# Patient Record
Sex: MALE | Race: BLACK OR AFRICAN AMERICAN | Employment: UNEMPLOYED | URBAN - METROPOLITAN AREA
[De-identification: names, ages, dates, MRNs, and addresses within clinical notes are randomized per-mention and may not be internally consistent; named-entity substitution may affect disease eponyms.]

---

## 2017-05-09 ENCOUNTER — HOSPITAL ENCOUNTER (INPATIENT)
Age: 27
LOS: 3 days | Discharge: HOME | DRG: 885 | End: 2017-05-12
Attending: PSYCHIATRY & NEUROLOGY | Admitting: PSYCHIATRY & NEUROLOGY
Payer: COMMERCIAL

## 2017-05-09 PROBLEM — F19.20 POLYSUBSTANCE DEPENDENCE (HCC): Status: ACTIVE | Noted: 2017-05-09

## 2017-05-09 PROBLEM — F29 PSYCHOSIS (HCC): Status: ACTIVE | Noted: 2017-05-09

## 2017-05-09 PROBLEM — F29 PSYCHOTIC DISORDER (HCC): Status: ACTIVE | Noted: 2017-05-09

## 2017-05-09 PROBLEM — F31.2 BIPOLAR AFFECTIVE DISORDER, MANIC, SEVERE, WITH PSYCHOTIC BEHAVIOR (HCC): Status: ACTIVE | Noted: 2017-05-09

## 2017-05-09 PROCEDURE — 74011250637 HC RX REV CODE- 250/637: Performed by: PSYCHIATRY & NEUROLOGY

## 2017-05-09 PROCEDURE — 65220000003 HC RM SEMIPRIVATE PSYCH

## 2017-05-09 RX ORDER — DIAZEPAM 5 MG/1
10 TABLET ORAL
Status: DISCONTINUED | OUTPATIENT
Start: 2017-05-09 | End: 2017-05-11

## 2017-05-09 RX ORDER — IBUPROFEN 400 MG/1
400 TABLET ORAL
Status: DISCONTINUED | OUTPATIENT
Start: 2017-05-09 | End: 2017-05-12 | Stop reason: HOSPADM

## 2017-05-09 RX ORDER — PROCHLORPERAZINE EDISYLATE 5 MG/ML
10 INJECTION INTRAMUSCULAR; INTRAVENOUS
Status: DISCONTINUED | OUTPATIENT
Start: 2017-05-09 | End: 2017-05-12 | Stop reason: HOSPADM

## 2017-05-09 RX ORDER — LANOLIN ALCOHOL/MO/W.PET/CERES
100 CREAM (GRAM) TOPICAL DAILY
Status: DISCONTINUED | OUTPATIENT
Start: 2017-05-09 | End: 2017-05-11

## 2017-05-09 RX ORDER — FOLIC ACID 1 MG/1
1 TABLET ORAL DAILY
Status: DISCONTINUED | OUTPATIENT
Start: 2017-05-09 | End: 2017-05-11

## 2017-05-09 RX ORDER — BENZTROPINE MESYLATE 2 MG/1
2 TABLET ORAL
Status: DISCONTINUED | OUTPATIENT
Start: 2017-05-09 | End: 2017-05-12 | Stop reason: HOSPADM

## 2017-05-09 RX ORDER — ACETAMINOPHEN 325 MG/1
650 TABLET ORAL
Status: DISCONTINUED | OUTPATIENT
Start: 2017-05-09 | End: 2017-05-12 | Stop reason: HOSPADM

## 2017-05-09 RX ORDER — OLANZAPINE 5 MG/1
5 TABLET ORAL
Status: DISCONTINUED | OUTPATIENT
Start: 2017-05-09 | End: 2017-05-12 | Stop reason: HOSPADM

## 2017-05-09 RX ORDER — BENZTROPINE MESYLATE 1 MG/ML
2 INJECTION INTRAMUSCULAR; INTRAVENOUS
Status: DISCONTINUED | OUTPATIENT
Start: 2017-05-09 | End: 2017-05-12 | Stop reason: HOSPADM

## 2017-05-09 RX ORDER — LORAZEPAM 1 MG/1
1 TABLET ORAL
Status: DISCONTINUED | OUTPATIENT
Start: 2017-05-09 | End: 2017-05-09

## 2017-05-09 RX ORDER — LORAZEPAM 2 MG/ML
2 INJECTION INTRAMUSCULAR
Status: DISCONTINUED | OUTPATIENT
Start: 2017-05-09 | End: 2017-05-12 | Stop reason: HOSPADM

## 2017-05-09 RX ORDER — IBUPROFEN 200 MG
1 TABLET ORAL
Status: DISCONTINUED | OUTPATIENT
Start: 2017-05-09 | End: 2017-05-12 | Stop reason: HOSPADM

## 2017-05-09 RX ORDER — PROCHLORPERAZINE MALEATE 10 MG
10 TABLET ORAL
Status: DISCONTINUED | OUTPATIENT
Start: 2017-05-09 | End: 2017-05-12 | Stop reason: HOSPADM

## 2017-05-09 RX ORDER — DIAZEPAM 5 MG/1
20 TABLET ORAL
Status: DISCONTINUED | OUTPATIENT
Start: 2017-05-09 | End: 2017-05-11

## 2017-05-09 RX ORDER — ADHESIVE BANDAGE
30 BANDAGE TOPICAL DAILY PRN
Status: DISCONTINUED | OUTPATIENT
Start: 2017-05-09 | End: 2017-05-12 | Stop reason: HOSPADM

## 2017-05-09 RX ORDER — ZOLPIDEM TARTRATE 10 MG/1
10 TABLET ORAL
Status: DISCONTINUED | OUTPATIENT
Start: 2017-05-09 | End: 2017-05-12 | Stop reason: HOSPADM

## 2017-05-09 RX ADMIN — Medication 100 MG: at 12:00

## 2017-05-09 RX ADMIN — MULTIPLE VITAMINS W/ MINERALS TAB 1 TABLET: TAB at 12:00

## 2017-05-09 RX ADMIN — FOLIC ACID 1 MG: 1 TABLET ORAL at 12:00

## 2017-05-09 NOTE — H&P
INITIAL PSYCHIATRIC EVALUATION         IDENTIFICATION:    Patient Name  Elizabeth Martin   Date of Birth 1990   [facility] [id number]   Medical Record Number  [id number]      Age  28 y.o. PCP None   Admit date:  5/9/2017    Room Number  308/01  @ [facility]   Date of Service  5/9/2017            HISTORY         REASON FOR HOSPITALIZATION:  CC: \"manic and psychotic\". Pt admitted under a temporary snf order (TDO) for severe psychosis and justina proving to be/posing an imminent danger to self and others and an inability to care for self. HISTORY OF PRESENT ILLNESS:    The patient, Elizabeth Martin, is a 28 y.o. BLACK OR  male with a past psychiatric history significant for polysub dep, who presents at this time with complaints of (and/or evidence of) the following emotional symptoms: justina and psychosis. Additional symptomatology include grandiose, paranoid, Congregational preoc, aud and visual timoteo, and agitation. The above symptoms have been present for a couple days. These symptoms are of severe severity. These symptoms are intermittent/ fleeting in nature. The patient's condition has been precipitated by psychosocial stressors (no job x 1 month ). Patient's condition made worse by continued illicit drug use and alcohol use as well as treatment noncompliance. UDS: +MJ  BAL=188. ALLERGIES:  No Known Allergies   MEDICATIONS PRIOR TO ADMISSION:  No prescriptions prior to admission. PAST MEDICAL HISTORY:  Past Medical History:   Diagnosis Date    Polysubstance dependence ([facility] [address]    No past surgical history on file. SOCIAL HISTORY:    Social History     Social History    Marital status: SINGLE     Spouse name: N/A    Number of children: N/A    Years of education: N/A     Occupational History    Not on file. Social History Main Topics    Smoking status: Current Every Day Smoker     Types: Cigarettes    Smokeless tobacco: Not on file    Alcohol use Yes    Drug use:  Yes Special: Marijuana    Sexual activity: Not on file     Other Topics Concern    Not on file     Social History Narrative    Single, no children, lives with parents, born in [place] [facility], came to [address] in 2007. College grad. No job x 1 mo,       FAMILY HISTORY: History reviewed. No pertinent family history. No family history on file. REVIEW OF SYSTEMS:   Psychological ROS: positive for - behavioral disorder, irritability and mood swings  negative for - suicidal ideation  Pertinent items are noted in the History of Present Illness. All other Systems reviewed and are considered negative. MENTAL STATUS EXAM & VITALS         MENTAL STATUS EXAM (MSE):    MSE FINDINGS ARE WITHIN NORMAL LIMITS (WNL) UNLESS OTHERWISE STATED BELOW. ( ALL OF THE BELOW CATEGORIES OF THE MSE HAVE BEEN REVIEWED (reviewed 5/9/2017) AND UPDATED AS DEEMED APPROPRIATE )  General Presentation age appropriate and casually dressed, cooperative   Orientation disorganized, not oriented to situation, oriented to time, place and person   Vital Signs  See below (reviewed 5/9/2017); Vital Signs (BP, Pulse, & Temp) are within normal limits if not listed below.    Gait and Station Stable/steady, no ataxia   Musculoskeletal System No extrapyramidal symptoms (EPS); no abnormal muscular movements or Tardive Dyskinesia (TD); muscle strength and tone are within normal limits   Language No aphasia or dysarthria   Speech:  strong  accent   Thought Processes logical; normal rate of thoughts; fair abstract reasoning/computation   Thought Associations goal directed   Thought Content paranoid delusions, grandiose delusions, Quaker delusions, auditory hallucinations and visual hallucinations   Suicidal Ideations none   Homicidal Ideations none   Mood:  angry and labile    Affect:  happy   Memory recent  fair   Memory remote:  fair   Concentration/Attention:  distractable   Fund of Knowledge average   Insight:  limited   Reliability poor Judgment:  limited            VITALS:     Patient Vitals for the past 24 hrs:   Pulse Resp BP   05/09/17 1339 75 - -   05/09/17 1334 (!) 5 16 142/86   05/09/17 0914 (!) 59 20 113/76     Wt Readings from Last 3 Encounters:   05/09/17 76.2 kg (168 lb)     Temp Readings from Last 3 Encounters:   No data found for Temp     BP Readings from Last 3 Encounters:   05/09/17 142/86     Pulse Readings from Last 3 Encounters:   05/09/17 75            DATA       LABORATORY DATA:  Labs Reviewed - No data to display  No results found for any previous visit. RADIOLOGY REPORTS:  No results found for this or any previous visit. No results found.            MEDICATIONS       ALL MEDICATIONS  Current Facility-Administered Medications   Medication Dose Route Frequency    ziprasidone (GEODON) 20 mg in sterile water (preservative free) 1 mL injection  20 mg IntraMUSCular BID PRN    OLANZapine (ZyPREXA) tablet 5 mg  5 mg Oral Q6H PRN    benztropine (COGENTIN) tablet 2 mg  2 mg Oral BID PRN    benztropine (COGENTIN) injection 2 mg  2 mg IntraMUSCular BID PRN    LORazepam (ATIVAN) injection 2 mg  2 mg IntraMUSCular Q4H PRN    zolpidem (AMBIEN) tablet 10 mg  10 mg Oral QHS PRN    acetaminophen (TYLENOL) tablet 650 mg  650 mg Oral Q4H PRN    ibuprofen (MOTRIN) tablet 400 mg  400 mg Oral Q8H PRN    magnesium hydroxide (MILK OF MAGNESIA) 400 mg/5 mL oral suspension 30 mL  30 mL Oral DAILY PRN    nicotine (NICODERM CQ) 21 mg/24 hr patch 1 Patch  1 Patch TransDERmal DAILY PRN    diazePAM (VALIUM) tablet 10 mg  10 mg Oral Q1H PRN    diazePAM (VALIUM) tablet 20 mg  20 mg Oral Q1H PRN    prochlorperazine (COMPAZINE) injection 10 mg  10 mg IntraMUSCular Q6H PRN    prochlorperazine (COMPAZINE) tablet 10 mg  10 mg Oral C9L PRN    folic acid (FOLVITE) tablet 1 mg  1 mg Oral DAILY    thiamine (B-1) tablet 100 mg  100 mg Oral DAILY    multivitamin, tx-iron-ca-min (THERA-M w/ IRON) tablet 1 Tab  1 Tab Oral DAILY      SCHEDULED MEDICATIONS  Current Facility-Administered Medications   Medication Dose Route Frequency    folic acid (FOLVITE) tablet 1 mg  1 mg Oral DAILY    thiamine (B-1) tablet 100 mg  100 mg Oral DAILY    multivitamin, tx-iron-ca-min (THERA-M w/ IRON) tablet 1 Tab  1 Tab Oral DAILY                ASSESSMENT & PLAN        The patient Oren Oliva is a 28 y.o.  male who presents at this time for treatment of the following diagnoses:  Patient C/Vincenzo Berger 1106 Problem List:   Bipolar affective disorder, manic, severe, with psychotic behavior ([facility] [address] (5/9/2017)    Assessment: rule out vs substance induced justina/psychosis    Plan: observe, need more collateral info to elucidate nature of pathology, psych prns   Polysubstance dependence ([facility] (5/9/2017)    Assessment: severe    Plan: detox from alcohol          In summary, Oren Oliva presents with a severe exacerbation of the principal diagnosis, Bipolar affective disorder, manic, severe, with psychotic behavior ([facility]    While on the unit Oren Oliva will be provided with individual, milieu, occupational, group, and substance abuse therapies to address target symptoms as deemed appropriate for the individual patient. I agree with decision to admit patient. I have spoken to [facility] psychiatric /ED staff regarding the nature of patients's admission at this time. A coordinated, multidisplinary treatment team (includes the nurse, unit pharmcist,  and writer) round was conducted for this initial evaluation with the patient present. The following regarding medications was addressed during rounds with patient:   the risks and benefits of the proposed medication. The patient was given the opportunity to ask questions. Informed consent given to the use of the above medications.      I will continue to adjust psychiatric and non-psychiatric medications (see above \"medication\" section and orders section for details) as deemed appropriate & based upon diagnoses and response to treatment. I have reviewed admission (and previous/old) labs and medical tests in the EHR and or transferring hospital documents. I will continue to order blood tests/labs and diagnostic tests as deemed appropriate and review results as they become available (see orders for details). I have reviewed old psychiatric and medical records available in the EHR. I Will order additional psychiatric records from other institutions to further elucidate the nature of patient's psychopathology and review once available. I will gather additional collateral information from friends, family and o/p treatment team to further elucidate the nature of patient's psychopathology and baselline level of psychiatric functioning.         ESTIMATED LENGTH OF STAY:   7 days       STRENGTHS:  Exercising self-direction/Resourceful and Access to housing/residential stability                      SIGNED:    Allen Fernández MD  5/9/2017
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
## 2017-05-09 NOTE — BH NOTES
SOCIAL WORK NOTE:  Pt did not attend processing group.       RAYMOND Rajput, Supervisee in Social Work

## 2017-05-09 NOTE — IP AVS SNAPSHOT
Summary of Care Report The Summary of Care report has been created to help improve care coordination. Users with access to [employer] or [address] (Web-based application) may access additional patient information including the Discharge Summary. If you are not currently a [address] user and need more information, please call the number listed below in the [place] [address] section and ask to be connected with Medical Records. Facility Information Name Address Phone [facility] [address] [place] [id number] [phone] Patient Information Patient Name Sex PRASHANTH Abreu ([id number]) Male 1990 Discharge Information Admitting Provider Service Area Unit Raeann Reis MD / [phone] [id number] [facility] / [phone] Discharge Provider Discharge Date/Time Discharge Disposition Destination (none) 2017 Evening (Pending) AHR (none) Patient Language Language ENGLISH [13] Hospital Problems as of 2017  Never Reviewed Class Noted - Resolved Last Modified POA Active Problems * (Principal)Bipolar affective disorder, manic, severe, with psychotic behavior ([facility] [address]  2017 - Present 2017 by Arnel Diop MD Unknown Entered by Arnel Diop MD  
  Polysubstance dependence ([facility] [address]  2017 - Present 2017 by Arnel Diop MD Unknown Entered by Arnel Diop MD  
  
You are allergic to the following No active allergies Current Discharge Medication List  
  
Notice You have not been prescribed any medications. Follow-up Information Follow up With Details Comments Contact Info  [address] [place] [address]  Please follow up with [facility] by accessing walk-in Mon-Thurs from 8:30am-3pm and Friday 8:30am-2pm. Please bring ID, proof of address and income. Isidoro [address] [place] [address] [address] [address] [phone] None   None (395) Patient stated that they have no PCP Discharge Instructions DISCHARGE SUMMARY from Nurse The following personal items are in your possession at time of discharge: 
 
Dental Appliances: None Home Medications: None Jewelry: None Clothing: Belt, Pants, Shirt, Slippers (Brown belt, red shirt, beige pants, black and red flip flops) Other Valuables: Cell [employer], [employer] PATIENT INSTRUCTIONS: 
 
After general anesthesia or intravenous sedation, for 24 hours or while taking prescription Narcotics: · Limit your activities · Do not drive and operate hazardous machinery · Do not make important personal or business decisions · Do  not drink alcoholic beverages · If you have not urinated within 8 hours after discharge, please contact your surgeon on call. Report the following to your surgeon: 
· Excessive pain, swelling, redness or odor of or around the surgical area · Temperature over 100.5 · Nausea and vomiting lasting longer than 4 hours or if unable to take medications · Any signs of decreased circulation or nerve impairment to extremity: change in color, persistent  numbness, tingling, coldness or increase pain · Any questions *  Please give a list of your current medications to your Primary Care Provider. *  Please update this list whenever your medications are discontinued, doses are 
    changed, or new medications (including over-the-counter products) are added. *  Please carry medication information at all times in case of emergency situations. These are general instructions for a healthy lifestyle: No smoking/ No tobacco products/ Avoid exposure to second hand smoke Surgeon General's Warning:  Quitting smoking now greatly reduces serious risk to your health.  
 
Obesity, smoking, and sedentary lifestyle greatly increases your risk for illness A healthy diet, regular physical exercise & weight monitoring are important for maintaining a healthy lifestyle You may be retaining fluid if you have a history of heart failure or if you experience any of the following symptoms:  Weight gain of 3 pounds or more overnight or 5 pounds in a week, increased swelling in our hands or feet or shortness of breath while lying flat in bed. Please call your doctor as soon as you notice any of these symptoms; do not wait until your next office visit. Recognize signs and symptoms of STROKE: 
 
F-face looks uneven A-arms unable to move or move unevenly S-speech slurred or non-existent T-time-call 911 as soon as signs and symptoms begin-DO NOT go Back to bed or wait to see if you get better-TIME IS BRAIN. Warning Signs of HEART ATTACK Call 911 if you have these symptoms: 
? Chest discomfort. Most heart attacks involve discomfort in the center of the chest that lasts more than a few minutes, or that goes away and comes back. It can feel like uncomfortable pressure, squeezing, fullness, or pain. ? Discomfort in other areas of the upper body. Symptoms can include pain or discomfort in one or both arms, the back, neck, jaw, or stomach. ? Shortness of breath with or without chest discomfort. ? Other signs may include breaking out in a cold sweat, nausea, or lightheadedness. Don't wait more than five minutes to call [address]! Fast action can save your life. Calling 911 is almost always the fastest way to get lifesaving treatment. Emergency Medical Services staff can begin treatment when they arrive  up to an hour sooner than if someone gets to the hospital by car. If I feel that I can not keep these promises and I am at risk of hurting myself or others, I will call the crisis office and speak with a crisis worker who will assist me during my crisis. [address] [place] [address]                [phone] [facility] [place] [address]                      [phone] [place] Crisis           [phone] [facility]            [phone] The discharge information has been reviewed with the patient. The patient verbalized understanding. Discharge medications reviewed with the patient and appropriate educational materials and side effects teaching were provided. Chart Review Routing History No Routing History on File

## 2017-05-09 NOTE — IP AVS SNAPSHOT
Bertin Betancur 
 
 
 [address] Patient: Sergey Canales MRN: [id number]:[id number] You are allergic to the following No active allergies Recent Documentation Height Weight BMI Smoking Status 1.727 m 76.2 kg 25.54 kg/m2 Current Every Day Smoker About your hospitalization You were admitted on:  May 9, 2017 You last received care in the:  [facility] You were discharged on:  May 12, 2017 Unit phone number:  [phone] Why you were hospitalized Your primary diagnosis was:  Bipolar Affective Disorder, Manic, Severe, With Psychotic Behavior ([facility]) Your diagnoses also included:  Polysubstance Dependence ([facility]) Providers Seen During Your Hospitalizations Provider Role Specialty Primary office phone Ave Brandt MD Attending Provider Psychiatry [phone] Your Primary Care Physician (PCP) Primary Care Physician Office Phone Office Fax NONE ** None ** ** None ** Follow-up Information Follow up With Details Comments Contact [address]  Please follow up with [facility] by accessing walk-in Mon-Thurs from 8:30am-3pm and Friday 8:30am-2pm. Please bring ID, proof of address and income. [address] [address] [phone] None   None (395) Patient stated that they have no PCP Current Discharge Medication List  
  
Notice You have not been prescribed any medications. Discharge Instructions DISCHARGE SUMMARY from Nurse The following personal items are in your possession at time of discharge: 
 
Dental Appliances: None Home Medications: None Jewelry: None Clothing: Belt, Pants, Shirt, Slippers (Brown belt, red shirt, beige pants, black and red flip flops) Other Valuables: Cell Phone, Devang Bello PATIENT INSTRUCTIONS: 
 
 
F-face looks uneven A-arms unable to move or move unevenly S-speech slurred or non-existent T-time-call 911 as soon as signs and symptoms begin-DO NOT go Back to bed or wait to see if you get better-TIME IS BRAIN. Warning Signs of HEART ATTACK Call 911 if you have these symptoms: 
? Chest discomfort. Most heart attacks involve discomfort in the center of the chest that lasts more than a few minutes, or that goes away and comes back. It can feel like uncomfortable pressure, squeezing, fullness, or pain. ? Discomfort in other areas of the upper body. Symptoms can include pain or discomfort in one or both arms, the back, neck, jaw, or stomach. ? Shortness of breath with or without chest discomfort. ? Other signs may include breaking out in a cold sweat, nausea, or lightheadedness. Don't wait more than five minutes to call [address]! Fast action can save your life. Calling 911 is almost always the fastest way to get lifesaving treatment. Emergency Medical Services staff can begin treatment when they arrive  up to an hour sooner than if someone gets to the hospital by car. If I feel that I can not keep these promises and I am at risk of hurting myself or others, I will call the crisis office and speak with a crisis worker who will assist me during my crisis. [address] [place] [address]                [phone] [address] [place] [address],[place] [address]                      [phone] [address] [place] [address] Crisis           [phone] [facility] Crisis            [phone] The discharge information has been reviewed with the patient. The patient verbalized understanding. Discharge medications reviewed with the patient and appropriate educational materials and side effects teaching were provided. Discharge Orders None [facility] Announcement We are excited to announce that we are making your provider's discharge notes available to you in amSTATZ. You will see these notes when they are completed and signed by the physician that discharged you from your recent hospital stay. If you have any questions or concerns about any information you see in amSTATZ, please call the Health Information Department where you were seen or reach out to your Primary Care Provider for more information about your plan of care. Introducing [facility]! Mariella Yi introduces amSTATZ patient portal. Now you can access parts of your medical record, email your doctor's office, and request medication refills online. 1. In your internet browser, go to [email or web address] 2. Click on the First Time User? Click Here link in the Sign In box. You will see the New Member Sign Up page. 3. Enter your amSTATZ Access Code exactly as it appears below. You will not need to use this code after youve completed the sign-up process. If you do not sign up before the expiration date, you must request a new code. · amSTATZ Access Code: [email or web address] Expires: 8/7/2017  8:47 AM 
 
4. Enter the last four digits of your Social Security Number (xxxx) and Date of Birth (mm/dd/yyyy) as indicated and click Submit. You will be taken to the next sign-up page. 5. Create a amSTATZ ID. This will be your amSTATZ login ID and cannot be changed, so think of one that is secure and easy to remember. 6. Create a amSTATZ password. You can change your password at any time. 7. Enter your Password Reset Question and Answer. This can be used at a later time if you forget your password. 8. Enter your e-mail address. You will receive e-mail notification when new information is available in [address] 9. Click Sign Up. You can now view and download portions of your medical record.  
10. Click the Download Summary menu link to download a portable copy of your medical information. If you have questions, please visit the Frequently Asked Questions section of the [employer] website. Remember, MyChart is NOT to be used for urgent needs. For medical emergencies, dial 911. Now available from your iPhone and Android! General Information Please provide this summary of care documentation to your next provider. Patient Signature:  ____________________________________________________________ Date:  ____________________________________________________________  
  
Gatha Cyphers Provider Signature:  ____________________________________________________________ Date:  ____________________________________________________________

## 2017-05-09 NOTE — H&P
History and Physical    Subjective:     Krissy Monahan is a 28 y.o. male with past medical hx significant for polysubstance abuse. Pt is hospitalized with a diagnosis of decompensated schizophrenia. Pt denies any acute or chronic issues. Pt is showing no signs of serious medical issue. Pt admits to using marjuana. Past Medical History:   Diagnosis Date    Polysubstance dependence [facility])       PSHx: none declared by pt. FHx:DM     Social History   Substance Use Topics    Smoking status: Current Every Day Smoker     Types: Cigarettes    Smokeless tobacco: None    Alcohol use Yes     >uses marjuana    Prior to Admission medications    Not on File     No Known Allergies     Review of Systems:  Constitutional: negative  Eyes: negative  Ears, Nose, Mouth, Throat, and Face: negative  Respiratory: negative  Cardiovascular: negative  Gastrointestinal: negative  Genitourinary:negative  Integument/Breast: negative  Hematologic/Lymphatic: negative  Musculoskeletal:negative  Neurological: negative  Behavioral/Psychiatric: negative  Endocrine: negative  Allergic/Immunologic: negative     Objective: Intake and Output:            Physical Exam:   Visit Vitals    /69    Pulse 62    Temp 98.2 °F (36.8 °C)    Resp 16    Ht 5' 8\" (1.727 m)    Wt 76.2 kg (168 lb)    SpO2 100%    BMI 25.54 kg/m2     General:  Alert, cooperative, no distress, appears stated age. Head:  Normocephalic, without obvious abnormality, atraumatic. Eyes:  Conjunctivae/corneas clear. PERRL, EOMs intact. Ears:  Normal external ear canals both ears. Nose: Nares normal. Septum midline. Mucosa normal. No drainage or sinus tenderness. Throat: Lips, mucosa, and tongue normal. Teeth and gums normal.   Neck: Supple, symmetrical, trachea midline, no adenopathy, thyroid: no enlargement/tenderness/nodules, no carotid bruit and no JVD. Back:   Symmetric, no curvature. ROM normal. No CVA tenderness.    Lungs:   Clear to auscultation bilaterally. Chest wall:  No tenderness or deformity. Heart:  Regular rate and rhythm, S1, S2 normal, no murmur, click, rub or gallop. Abdomen:   Soft, non-tender. Bowel sounds normal. No masses,  No organomegaly. Extremities: Extremities normal, atraumatic, no cyanosis or edema. Pulses: 2+ and symmetric all extremities. Skin: Skin color, texture, turgor normal. No rashes or lesions   Lymph nodes: Cervical, supraclavicular, and axillary nodes normal.   Neurologic:} CNII-XII intact. Normal strength, sensation and reflexes throughout. ECG:       Data Review:   No results found for this or any previous visit (from the past 24 hour(s)). Assessment:     Principal Problem:    Bipolar affective disorder, manic, severe, with psychotic behavior ([facility] [address] (5/9/2017)    Active Problems:    Polysubstance dependence ([facility] [address] (5/9/2017)    Plan:     No acute medical issue identified. Cont psych stabilization. No VTE prophylaxis indicated or necessary at this time.    Signed By: Yaya Quispe MD     May 9, 2017

## 2017-05-10 PROCEDURE — 65220000003 HC RM SEMIPRIVATE PSYCH

## 2017-05-10 NOTE — BH NOTES
SOCIAL WORK NOTE:  Pt did not attend processing group.       RAYMOND Arreaga, Supervisee in Social Work

## 2017-05-10 NOTE — PROGRESS NOTES
Problem: Psychosis  Goal: *STG: Remains safe in hospital  Outcome: Progressing Towards Goal  Pt rested quietly in bed with eyes closed. No signs/symptoms of distress, agitation, or anxiety. Will monitor for changes. Q 15 minute checks continue. Pt slept 8 hrs. He stated this am \" I refuse to cooperate with anything. Suzette Norrisf Suzette Norrisf I just want to be discharged\"

## 2017-05-10 NOTE — BH NOTES
Worker contacted patient's father, Yamilex Syed (ph: [phone] and [phone]). He said the patient has used marijuana for at least 3 years or more and knows of no other illicit drug use. He's had no previous mental health history or psychiatric hospitalizations. When the patient lived with his father in [place] 1-2 years ago he would sometimes stay out all night or away for 1-2 days. The family finds it odd that the patient has a college degree and has turned down job interviews to use the degree. The patient isolates at times as well. The father was notified in recent weeks that the patient was sleeping in a park and due to his instability and job loss (as noted in his history) the father brought him to their home in [address] [place] [address]. Worker will continue to provide support through treatment and discharge planning.      Yolanda Kay LCSW

## 2017-05-10 NOTE — BH NOTES
PSYCHIATRIC PROGRESS NOTE         Patient Name  Marvin Bang   Date of Birth 1990   [facility] [id number]   Medical Record Number  [id number]      Age  28 y.o. PCP None   Admit date:  5/9/2017    Room Number  308/01  @ [facility]   Date of Service  5/10/2017          PSYCHOTHERAPY SESSION NOTE:  Length of psychotherapy session: 20 minutes    Main condition/diagnosis/issues treated during session today, 5/10/2017 : drug dep, occupational stressors    I employed Cognitive Behavioral therapy techniques, Reality-Oriented psychotherapy, as well as supportive psychotherapy in regards to various ongoing psychosocial stressors, including the following: pre-admission and current problems; housing issues; occupational issues; academic issues; legal issues; medical issues; and stress of hospitalization. Interpersonal relationship issues and psychodynamic conflicts explored. Attempts made to alleviate maladaptive patterns. We, also, worked on issues of denial & effects of substance dependency/use     Overall, patient is not progressing    Treatment Plan Update (reviewed an updated 5/10/2017) : I will modify psychotherapy tx plan by implementing more stress management strategies, building upon cognitive behavioral techniques, increasing coping skills, as well as shoring up psychological defenses). An extended energy and skill set was needed to engage pt in psychotherapy due to some of the following: resistiveness, complexity, negativity, confrontational nature, hostile behaviors, and/or severe abnormalities in thought processes/psychosis resulting in the loss of expressive/receptive language communication skills. E & M PROGRESS NOTE:         HISTORY       CC:  \"i'm fine\"  HISTORY OF PRESENT ILLNESS/INTERVAL HISTORY:  (reviewed/updated 5/10/2017). per initial evaluation: The patient, Marvin Bang, is a 28 y.o.   BLACK OR  male with a past psychiatric history significant for polysub dep, who presents at this time with complaints of (and/or evidence of) the following emotional symptoms: justina and psychosis. Additional symptomatology include grandiose, paranoid, Yazdanism preoc, aud and visual timoteo, and agitation. The above symptoms have been present for a couple days. These symptoms are of severe severity. These symptoms are intermittent/ fleeting in nature. The patient's condition has been precipitated by psychosocial stressors (no job x 1 month ). Patient's condition made worse by continued illicit drug use and alcohol use as well as treatment noncompliance. UDS: +MJ  AZX=911. Spencer Poles presents/reports/evidences the following emotional symptoms today, 5/10/2017:justina. The above symptoms have been present for a few weeks. These symptoms are of severe severity. The symptoms are intermittent/ fleeting in nature. Additional symptomatology and features include denial, agitation. SIDE EFFECTS: (reviewed/updated 5/10/2017)  None reported or admitted to. ALLERGIES:(reviewed/updated 5/10/2017)  No Known Allergies   MEDICATIONS PRIOR TO ADMISSION:(reviewed/updated 5/10/2017)  No prescriptions prior to admission. PAST MEDICAL HISTORY: Past medical history from the initial psychiatric evaluation has been reviewed (reviewed/updated 5/10/2017) with no additional updates (I asked patient and no additional past medical history provided). Past Medical History:   Diagnosis Date    Polysubstance dependence ([facility] [address]    No past surgical history on file. SOCIAL HISTORY: Social history from the initial psychiatric evaluation has been reviewed (reviewed/updated 5/10/2017) with no additional updates (I asked patient and no additional social history provided). Social History     Social History    Marital status: SINGLE     Spouse name: N/A    Number of children: N/A    Years of education: N/A     Occupational History    Not on file.      Social History Main Topics    Smoking status: Current Every Day Smoker     Types: Cigarettes    Smokeless tobacco: Not on file    Alcohol use Yes    Drug use: Yes     Special: Marijuana    Sexual activity: Not on file     Other Topics Concern    Not on file     Social History Narrative    Single, no children, lives with parents, born in [place] and [facility], came to [facility] [facility] in 2007. College grad. No job x 1 mo,       FAMILY HISTORY: Family history from the initial psychiatric evaluation has been reviewed (reviewed/updated 5/10/2017) with no additional updates (I asked patient and no additional family history provided). Family History   Problem Relation Age of Onset    Alcohol abuse Father        REVIEW OF SYSTEMS: (reviewed/updated 5/10/2017)  Appetite:good   Sleep: good   All other Review of Systems: Respiratory ROS: no cough, shortness of breath, or wheezing  negative  Cardiovascular ROS: no chest pain or dyspnea on exertion  negative  Gastrointestinal ROS: no abdominal pain, change in bowel habits, or black or bloody stools  negative  Neurological ROS: no TIA or stroke symptoms  negative         [address] [place] [address] ([facility]):    [facility] FINDINGS ARE WITHIN NORMAL LIMITS (WNL) UNLESS OTHERWISE STATED BELOW. ( ALL OF THE BELOW CATEGORIES OF THE [facility] HAVE BEEN REVIEWED (reviewed 5/10/2017) AND UPDATED AS DEEMED APPROPRIATE )  General Presentation age appropriate, unreliable   Orientation disorganized, oriented to time, place and person   Vital Signs  See below (reviewed 5/10/2017); Vital Signs (BP, Pulse, & Temp) are within normal limits if not listed below.    Gait and Station Stable/steady, no ataxia   Musculoskeletal System No extrapyramidal symptoms (EPS); no abnormal muscular movements or Tardive Dyskinesia (TD); muscle strength and tone are within normal limits   Language No aphasia or dysarthria   Speech:  monotone   Thought Processes logical; slow rate of thoughts; poor abstract reasoning/computation Thought Associations goal directed and tangential   Thought Content free of delusions and free of hallucinations   Suicidal Ideations no plan , no intention and none   Homicidal Ideations no plan , no intention and none   Mood:  irritable and labile    Affect:  euthymic and happy   Memory recent  fair   Memory remote:  fair   Concentration/Attention:  fair   Fund of Knowledge average   Insight:  poor   Reliability poor   Judgment:  poor          VITALS:     Patient Vitals for the past 24 hrs:   Temp Pulse Resp BP SpO2   05/10/17 0600 96.8 °F (36 °C) (!) 50 18 136/75 -   05/09/17 1845 98.2 °F (36.8 °C) 62 16 136/69 100 %     Wt Readings from Last 3 Encounters:   05/09/17 76.2 kg (168 lb)     Temp Readings from Last 3 Encounters:   05/10/17 96.8 °F (36 °C)     BP Readings from Last 3 Encounters:   05/10/17 136/75     Pulse Readings from Last 3 Encounters:   05/10/17 (!) 50            DATA     LABORATORY DATA:(reviewed/updated 5/10/2017)  No results found for this or any previous visit (from the past 24 hour(s)). No results found for: VALF2, VALAC, VALP, VALPR, DS6, CRBAM, CRBAMP, CARB2, XCRBAM  No results found for: LI, LIH, LITHM   RADIOLOGY REPORTS:(reviewed/updated 5/10/2017)  No results found.        MEDICATIONS     ALL MEDICATIONS:   Current Facility-Administered Medications   Medication Dose Route Frequency    ziprasidone (GEODON) 20 mg in sterile water (preservative free) 1 mL injection  20 mg IntraMUSCular BID PRN    OLANZapine (ZyPREXA) tablet 5 mg  5 mg Oral Q6H PRN    benztropine (COGENTIN) tablet 2 mg  2 mg Oral BID PRN    benztropine (COGENTIN) injection 2 mg  2 mg IntraMUSCular BID PRN    LORazepam (ATIVAN) injection 2 mg  2 mg IntraMUSCular Q4H PRN    zolpidem (AMBIEN) tablet 10 mg  10 mg Oral QHS PRN    acetaminophen (TYLENOL) tablet 650 mg  650 mg Oral Q4H PRN    ibuprofen (MOTRIN) tablet 400 mg  400 mg Oral Q8H PRN    magnesium hydroxide (MILK OF MAGNESIA) 400 mg/5 mL oral suspension 30 mL 30 mL Oral DAILY PRN    nicotine (NICODERM CQ) 21 mg/24 hr patch 1 Patch  1 Patch TransDERmal DAILY PRN    diazePAM (VALIUM) tablet 10 mg  10 mg Oral Q1H PRN    diazePAM (VALIUM) tablet 20 mg  20 mg Oral Q1H PRN    prochlorperazine (COMPAZINE) injection 10 mg  10 mg IntraMUSCular Q6H PRN    prochlorperazine (COMPAZINE) tablet 10 mg  10 mg Oral Z8Y PRN    folic acid (FOLVITE) tablet 1 mg  1 mg Oral DAILY    thiamine (B-1) tablet 100 mg  100 mg Oral DAILY    multivitamin, tx-iron-ca-min (THERA-M w/ IRON) tablet 1 Tab  1 Tab Oral DAILY      SCHEDULED MEDICATIONS:   Current Facility-Administered Medications   Medication Dose Route Frequency    folic acid (FOLVITE) tablet 1 mg  1 mg Oral DAILY    thiamine (B-1) tablet 100 mg  100 mg Oral DAILY    multivitamin, tx-iron-ca-min (THERA-M w/ IRON) tablet 1 Tab  1 Tab Oral DAILY          ASSESSMENT & PLAN     DIAGNOSES REQUIRING ACTIVE TREATMENT AND MONITORING: (reviewed/updated 5/10/2017)  Patient Active Hospital Problem List:    Bipolar affective disorder, manic, severe, with psychotic behavior ([facility] [address] (5/9/2017)    Assessment: rule out vs substance (MJ) induced justina/psychosis, dif to distinguish the 2 dx at this time. Atypical justina at present-happy/silly, denial, sl abnl thought processes. Sleeping well. Plan: i will cont to observe before starting scheduled psych meds, cont with prn psych meds need more collateral info to elucidate nature of true pathology, psych prns. Consider starting depakote and risperdal based on collateral info. Polysubstance dependence ([facility] [address] (5/9/2017)    Assessment: severe    Plan: detox from alcohol        In summary, Andreia Mercado, is a 28 y.o.  male who presents with a severe exacerbation of the principal diagnosis of Bipolar affective disorder, manic, severe, with psychotic behavior ([facility] [address]  Patient's condition is worsening/not improving/not stable .   Patient requires continued inpatient hospitalization for further stabilization, safety monitoring and medication management. I will continue to coordinate the provision of individual, milieu, occupational, group, and substance abuse therapies to address target symptoms/diagnoses as deemed appropriate for the individual patient. A coordinated, multidisplinary treatment team round was conducted with the patient (this team consists of the nurse, psychiatric unit pharmcist,  and writer). Complete current electronic health record for patient has been reviewed today including consultant notes, ancillary staff notes, nurses and psychiatric tech notes. Suicide risk assessment completed and patient deemed to be of low risk for suicide at this time. The following regarding medications was addressed during rounds with patient:   the risks and benefits of the proposed medication. The patient was given the opportunity to ask questions. Informed consent given to the use of the above medications. Will continue to adjust psychiatric and non-psychiatric medications (see above \"medication\" section and orders section for details) as deemed appropriate & based upon diagnoses and response to treatment. I will continue to order blood tests/labs and diagnostic tests as deemed appropriate and review results as they become available (see orders for details and above listed lab/test results). I will order psychiatric records from previous [facility] to further elucidate the nature of patient's psychopathology and review once available. I will gather additional collateral information from friends, family and o/p treatment team to further elucidate the nature of patient's psychopathology and baselline level of psychiatric functioning.          I certify that this patient's inpatient psychiatric hospital services furnished since the previous certification were, and continue to be, required for treatment that could reasonably be expected to improve the patient's condition, or for diagnostic study, and that the patient continues to need, on a daily basis, active treatment furnished directly by or requiring the supervision of inpatient psychiatric facility personnel. In addition, the hospital records show that services furnished were intensive treatment services, admission or related services, or equivalent services.     EXPECTED DISCHARGE DATE/DAY: TBD     DISPOSITION: Home       Signed By:   Sofi Williamson MD  5/10/2017

## 2017-05-10 NOTE — BH NOTES
GROUP THERAPY PROGRESS NOTE    Yoel Barnard is participating in [place].      Group time: 30 minutes    Personal goal for participation: daily orientation    Goal orientation: personal    Group therapy participation: active    Therapeutic interventions reviewed and discussed: yes    Impression of participation: Attend    Hilda Lu  5/10/2017
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
## 2017-05-10 NOTE — PROGRESS NOTES
Problem: Psychosis  Goal: *STG/LTG: Demonstrates improved social functioning by responding appropriately to staff  Outcome: Progressing Towards Goal  Pt has been primarily w/d to room but out for meds and meals and briefly to visit parents. Parents report that patient abruptly stated he was done visiting and they should leave. Pt makes some eye contact. A&O x 4. Affect has range, mood is euthymic. Patient has no insight into events leading up to hospitalization and states his parents believe he is crazy but he is not. No s/s of withdrawal at this time. Hygiene is adequate, independent in ADLs. Gait is steady. Denies SI/HI. Denies hallucinations at present. Will continue to monitor pt with q 15 min checks.

## 2017-05-10 NOTE — BH NOTES
The patient has no insight @ this documentation. Patient orientated X 3. Thought blockage is evidenced,with thought process being loose in Associations with topic. Pt contracted for safety. Patient appetite minimal,personal hygiene fair. Patient isolative & preoccupied. Pt continues to be delusional & confused. .   Patient rec'ed medications per medication nurse. 1:1 interaction to assess mood and thought process. Staff offering assistance as needed. Pt denies S/H ideations. Pt continues to be responding to internal stimuli. Patient admits to hearing voices. Pt absent from most groups with limited concentration and psychotic symptoms present. Pt was committed here to [facility] during his TDO Hearing. Staff plan is to assess mood & thought process. Staff will monitor for changes. Q 15 minute checks continue.

## 2017-05-11 PROCEDURE — 65220000003 HC RM SEMIPRIVATE PSYCH

## 2017-05-11 NOTE — BH NOTES
GROUP THERAPY PROGRESS NOTE    Jaclyn Kelley is participating in Reality orientation. Group time: 45 minutes    Personal goal for participation: Review and discuss ways to nurture yourself    Goal orientation: personal    Group therapy participation: minimal    Therapeutic interventions reviewed and discussed:   Topic: Discover possibilities for nurturing yourself    Pt reported feeling \"alright\" and could not identify any emotions. He stated that happiness is a prime motivation in his life. Pt reported to enjoy traveling, specifically to [facility]. He stated that he is a good  and enjoys sightseeing. Pt reported that ones spirituality helps one navigate life's challenges. Impression of participation:   Pt presented with a constricted affect and euthymic mood. He was an active participant and contributed to discussion. Pts behavior was appropriate. His facial expression was often contorted in a grimace, but was not congruent with his tone of voice. His thoughts presented as organized.       RAYMOND Kimball, Supervisee in Social Work

## 2017-05-11 NOTE — PROGRESS NOTES
Problem: Psychosis  Goal: *STG: Remains safe in hospital  Outcome: Progressing Towards Goal  Pt rested quietly in bed with eyes closed. No signs/symptoms of distress, agitation, or anxiety. Will monitor for changes. Q 15 minute checks continue.  Pt slept 8 hrs

## 2017-05-11 NOTE — BH NOTES
GROUP THERAPY PROGRESS NOTE    The patient Sandra Monae a 28 y.o. male is participating in Coping Skills Group. Group time: 45 minutes    Personal goal for participation: To participate in healthy relationships bingo game    Goal orientation:  personal    Group therapy participation: active    Therapeutic interventions reviewed and discussed: things pertaining to relationships    Impression of participation:  The patient was attentive.     Blanca Bravo  5/11/2017  2:12 PM

## 2017-05-11 NOTE — BH NOTES
Swati Leroy TRANSFER - OUT REPORT:    Verbal report given to stanley rn(name) on Cristal Jordan  being transferred to behavioral health general (unit) for routine progression of care       Report consisted of patients Situation, Background, Assessment and   Recommendations(SBAR). Information from the following report(s) SBAR was reviewed with the receiving nurse. Lines:       Opportunity for questions and clarification was provided.       Patient transported with:   Registered Nurse

## 2017-05-11 NOTE — PROGRESS NOTES
GROUP THERAPY PROGRESS NOTE      Mr. Bill Moraes was not present for medication group.     u Lev Stewart, PharmD

## 2017-05-11 NOTE — BH NOTES
PSYCHIATRIC PROGRESS NOTE         Patient Name  Baylee Chowdary   Date of Birth 1990   [facility] [id number]   Medical Record Number  [id number]      Age  28 y.o. PCP None   Admit date:  5/9/2017    Room Number  324/01  @ [facility]   Date of Service  5/11/2017          PSYCHOTHERAPY SESSION NOTE:  Length of psychotherapy session: 20 minutes    Main condition/diagnosis/issues treated during session today, 5/11/2017 : drug dep, occupational stressors    I employed Cognitive Behavioral therapy techniques, Reality-Oriented psychotherapy, as well as supportive psychotherapy in regards to various ongoing psychosocial stressors, including the following: pre-admission and current problems; housing issues; occupational issues; academic issues; legal issues; medical issues; and stress of hospitalization. Interpersonal relationship issues and psychodynamic conflicts explored. Attempts made to alleviate maladaptive patterns. We, also, worked on issues of denial & effects of substance dependency/use     Overall, patient is not progressing    Treatment Plan Update (reviewed an updated 5/11/2017) : I will modify psychotherapy tx plan by implementing more stress management strategies, building upon cognitive behavioral techniques, increasing coping skills, as well as shoring up psychological defenses). An extended energy and skill set was needed to engage pt in psychotherapy due to some of the following: resistiveness, complexity, negativity, confrontational nature, hostile behaviors, and/or severe abnormalities in thought processes/psychosis resulting in the loss of expressive/receptive language communication skills. E & M PROGRESS NOTE:         HISTORY       CC:  \"i'm fine\"  HISTORY OF PRESENT ILLNESS/INTERVAL HISTORY:  (reviewed/updated 5/11/2017). per initial evaluation: The patient, Baylee Chowdary, is a 28 y.o.   BLACK OR  male with a past psychiatric history significant for polysub dep, who presents at this time with complaints of (and/or evidence of) the following emotional symptoms: justina and psychosis. Additional symptomatology include grandiose, paranoid, Voodoo preoc, aud and visual timoteo, and agitation. The above symptoms have been present for a couple days. These symptoms are of severe severity. These symptoms are intermittent/ fleeting in nature. The patient's condition has been precipitated by psychosocial stressors (no job x 1 month ). Patient's condition made worse by continued illicit drug use and alcohol use as well as treatment noncompliance. UDS: +MJ  GDS=547. Debbieva Harryiliana presents/reports/evidences the following emotional symptoms today, 5/11/2017:justina. The above symptoms have been present for a few weeks. These symptoms are of severe severity. The symptoms are intermittent/ fleeting in nature. Additional symptomatology and features include denial, mild paranoia, agitation. Decrease in inapprop affect      SIDE EFFECTS: (reviewed/updated 5/11/2017)  None reported or admitted to. ALLERGIES:(reviewed/updated 5/11/2017)  No Known Allergies   MEDICATIONS PRIOR TO ADMISSION:(reviewed/updated 5/11/2017)  No prescriptions prior to admission. PAST MEDICAL HISTORY: Past medical history from the initial psychiatric evaluation has been reviewed (reviewed/updated 5/11/2017) with no additional updates (I asked patient and no additional past medical history provided). Past Medical History:   Diagnosis Date    Polysubstance dependence ([facility] [address] [facility]    No past surgical history on file. SOCIAL HISTORY: Social history from the initial psychiatric evaluation has been reviewed (reviewed/updated 5/11/2017) with no additional updates (I asked patient and no additional social history provided).    Social History     Social History    Marital status: SINGLE     Spouse name: N/A    Number of children: N/A    Years of education: N/A     Occupational History    Not on file. Social History Main Topics    Smoking status: Current Every Day Smoker     Types: Cigarettes    Smokeless tobacco: Not on file    Alcohol use Yes    Drug use: Yes     Special: Marijuana    Sexual activity: Not on file     Other Topics Concern    Not on file     Social History Narrative    Single, no children, lives with parents, born in [place] and [facility], came to [address] in 2007. College grad. No job x 1 mo,       FAMILY HISTORY: Family history from the initial psychiatric evaluation has been reviewed (reviewed/updated 5/11/2017) with no additional updates (I asked patient and no additional family history provided). Family History   Problem Relation Age of Onset    Alcohol abuse Father        REVIEW OF SYSTEMS: (reviewed/updated 5/11/2017)  Appetite:good   Sleep: good   All other Review of Systems: Respiratory ROS: no cough, shortness of breath, or wheezing  Cardiovascular ROS: no chest pain or dyspnea on exertion  negative  Gastrointestinal ROS: no abdominal pain, change in bowel habits, or black or bloody stools  negative  Neurological ROS: no TIA or stroke symptoms  negative         [facility] ([facility]):    [facility] FINDINGS ARE WITHIN NORMAL LIMITS (WNL) UNLESS OTHERWISE STATED BELOW. ( ALL OF THE BELOW CATEGORIES OF THE MSE HAVE BEEN REVIEWED (reviewed 5/11/2017) AND UPDATED AS DEEMED APPROPRIATE )  General Presentation age appropriate, unreliable   Orientation disorganized, oriented to time, place and person   Vital Signs  See below (reviewed 5/11/2017); Vital Signs (BP, Pulse, & Temp) are within normal limits if not listed below.    Gait and Station Stable/steady, no ataxia   Musculoskeletal System No extrapyramidal symptoms (EPS); no abnormal muscular movements or Tardive Dyskinesia (TD); muscle strength and tone are within normal limits   Language No aphasia or dysarthria   Speech:  monotone   Thought Processes logical; slow rate of thoughts; poor abstract reasoning/computation   Thought Associations goal directed and tangential   Thought Content free of delusions and free of hallucinations, sl guarded   Suicidal Ideations no plan , no intention and none   Homicidal Ideations no plan , no intention and none   Mood:  irritable and labile    Affect:  euthymic to irritable   Memory recent  fair   Memory remote:  fair   Concentration/Attention:  fair   Fund of Knowledge average   Insight:  poor   Reliability poor   Judgment:  poor          VITALS:     Patient Vitals for the past 24 hrs:   Temp Pulse Resp BP   05/11/17 0800 - 71 - 120/79   05/11/17 0651 97 °F (36.1 °C) (!) 59 16 122/73   05/10/17 1600 96 °F (35.6 °C) (!) 52 18 132/73     Wt Readings from Last 3 Encounters:   05/09/17 76.2 kg (168 lb)     Temp Readings from Last 3 Encounters:   05/11/17 97 °F (36.1 °C)     BP Readings from Last 3 Encounters:   05/11/17 120/79     Pulse Readings from Last 3 Encounters:   05/11/17 71            DATA     LABORATORY DATA:(reviewed/updated 5/11/2017)  No results found for this or any previous visit (from the past 24 hour(s)). No results found for: VALF2, VALAC, VALP, VALPR, DS6, CRBAM, CRBAMP, CARB2, XCRBAM  No results found for: LI, LIH, LITHM   RADIOLOGY REPORTS:(reviewed/updated 5/11/2017)  No results found.        MEDICATIONS     ALL MEDICATIONS:   Current Facility-Administered Medications   Medication Dose Route Frequency    ziprasidone (GEODON) 20 mg in sterile water (preservative free) 1 mL injection  20 mg IntraMUSCular BID PRN    OLANZapine (ZyPREXA) tablet 5 mg  5 mg Oral Q6H PRN    benztropine (COGENTIN) tablet 2 mg  2 mg Oral BID PRN    benztropine (COGENTIN) injection 2 mg  2 mg IntraMUSCular BID PRN    LORazepam (ATIVAN) injection 2 mg  2 mg IntraMUSCular Q4H PRN    zolpidem (AMBIEN) tablet 10 mg  10 mg Oral QHS PRN    acetaminophen (TYLENOL) tablet 650 mg  650 mg Oral Q4H PRN    ibuprofen (MOTRIN) tablet 400 mg  400 mg Oral Q8H PRN    magnesium hydroxide (MILK OF MAGNESIA) 400 mg/5 mL oral suspension 30 mL  30 mL Oral DAILY PRN    nicotine (NICODERM CQ) 21 mg/24 hr patch 1 Patch  1 Patch TransDERmal DAILY PRN    prochlorperazine (COMPAZINE) injection 10 mg  10 mg IntraMUSCular Q6H PRN    prochlorperazine (COMPAZINE) tablet 10 mg  10 mg Oral Q6H PRN      SCHEDULED MEDICATIONS:   Current Facility-Administered Medications   Medication Dose Route Frequency          ASSESSMENT & PLAN     DIAGNOSES REQUIRING ACTIVE TREATMENT AND MONITORING: (reviewed/updated 5/11/2017)  Patient Active Hospital Problem List:    Bipolar affective disorder, manic, severe, with psychotic behavior vs. substance (MJ) induced justina/psychosis, dif to distinguish the 2 dx at this time. irritable, denial, confabulation, sl abnl thought processes. Sleeping well.   collateral info from father obtained; \" He said the patient has used marijuana for at least 3 years or more and knows of no other illicit drug use. He's had no previous mental health history or psychiatric hospitalizations. When the patient lived with his father in [place] 1-2 years ago he would sometimes stay out all night or away for 1-2 days. The family finds it odd that the patient has a college degree and has turned down job interviews to use the degree. The patient isolates at times as well. The father was notified in recent weeks that the patient was sleeping in a park and due to his instability and job loss (as noted in his history) the father brought him to their home in [place]. \"       Plan: I will cont to observe before starting scheduled psych meds, cont with prn psych meds need more collateral info to elucidate nature of true pathology, psych prns. Will see how pt does on the general unit, has not been going to groups, will encourage group attendence as this will allow us to see more clearly his current pathology. Consider starting depakote and risperdal based on collateral info.       Polysubstance dependence ([facility] (5/9/2017)    Assessment: mostly MJ    Plan: detox from alcohol        In summary, Bill Moraes, is a 28 y.o.  male who presents with a severe exacerbation of the principal diagnosis of Bipolar affective disorder, manic, severe, with psychotic behavior ([facility]  Patient's condition is worsening/not improving/not stable . Patient requires continued inpatient hospitalization for further stabilization, safety monitoring and medication management. I will continue to coordinate the provision of individual, milieu, occupational, group, and substance abuse therapies to address target symptoms/diagnoses as deemed appropriate for the individual patient. A coordinated, multidisplinary treatment team round was conducted with the patient (this team consists of the nurse, psychiatric unit pharmcist,  and writer). Complete current electronic health record for patient has been reviewed today including consultant notes, ancillary staff notes, nurses and psychiatric tech notes. Suicide risk assessment completed and patient deemed to be of low risk for suicide at this time. The following regarding medications was addressed during rounds with patient:   the risks and benefits of the proposed medication. The patient was given the opportunity to ask questions. Informed consent given to the use of the above medications. Will continue to adjust psychiatric and non-psychiatric medications (see above \"medication\" section and orders section for details) as deemed appropriate & based upon diagnoses and response to treatment. I will continue to order blood tests/labs and diagnostic tests as deemed appropriate and review results as they become available (see orders for details and above listed lab/test results). I will order psychiatric records from previous [facility] hospitals to further elucidate the nature of patient's psychopathology and review once available.     I will gather additional collateral information from friends, family and o/p treatment team to further elucidate the nature of patient's psychopathology and baselline level of psychiatric functioning. I certify that this patient's inpatient psychiatric hospital services furnished since the previous certification were, and continue to be, required for treatment that could reasonably be expected to improve the patient's condition, or for diagnostic study, and that the patient continues to need, on a daily basis, active treatment furnished directly by or requiring the supervision of inpatient psychiatric facility personnel. In addition, the hospital records show that services furnished were intensive treatment services, admission or related services, or equivalent services.     EXPECTED DISCHARGE DATE/DAY: TBD     DISPOSITION: Home       Signed By:   Any Saenz MD  5/11/2017

## 2017-05-12 VITALS
BODY MASS INDEX: 25.46 KG/M2 | HEIGHT: 68 IN | WEIGHT: 168 LBS | TEMPERATURE: 95.9 F | SYSTOLIC BLOOD PRESSURE: 111 MMHG | RESPIRATION RATE: 16 BRPM | HEART RATE: 51 BPM | OXYGEN SATURATION: 100 % | DIASTOLIC BLOOD PRESSURE: 61 MMHG

## 2017-05-12 NOTE — BH NOTES
GROUP THERAPY PROGRESS NOTE    The patient Julia Reis a 28 y.o. male is participating in Coping Skills Group. Group time: 45 minutes    Personal goal for participation: To participate in happiness game    Goal orientation:  personal    Group therapy participation: active    Therapeutic interventions reviewed and discussed: life scenarios exploring the pursuit of happiness    Impression of participation:  The patient was attentive.     Marily Ferguson  5/12/2017  2:27 PM

## 2017-05-12 NOTE — DISCHARGE INSTRUCTIONS
DISCHARGE SUMMARY from Nurse    The following personal items are in your possession at time of discharge:    Dental Appliances: None        Home Medications: None  Jewelry: None  Clothing: Belt, Pants, Shirt, Slippers (Brown belt, red shirt, beige pants, black and red flip flops)  Other Valuables: Cell Phone, Wallet     PATIENT INSTRUCTIONS:    After general anesthesia or intravenous sedation, for 24 hours or while taking prescription Narcotics:  · Limit your activities  · Do not drive and operate hazardous machinery  · Do not make important personal or business decisions  · Do  not drink alcoholic beverages  · If you have not urinated within 8 hours after discharge, please contact your surgeon on call. Report the following to your surgeon:  · Excessive pain, swelling, redness or odor of or around the surgical area  · Temperature over 100.5  · Nausea and vomiting lasting longer than 4 hours or if unable to take medications  · Any signs of decreased circulation or nerve impairment to extremity: change in color, persistent  numbness, tingling, coldness or increase pain  · Any questions    *  Please give a list of your current medications to your Primary Care Provider. *  Please update this list whenever your medications are discontinued, doses are      changed, or new medications (including over-the-counter products) are added. *  Please carry medication information at all times in case of emergency situations. These are general instructions for a healthy lifestyle:    No smoking/ No tobacco products/ Avoid exposure to second hand smoke    Surgeon General's Warning:  Quitting smoking now greatly reduces serious risk to your health.     Obesity, smoking, and sedentary lifestyle greatly increases your risk for illness    A healthy diet, regular physical exercise & weight monitoring are important for maintaining a healthy lifestyle    You may be retaining fluid if you have a history of heart failure or if you experience any of the following symptoms:  Weight gain of 3 pounds or more overnight or 5 pounds in a week, increased swelling in our hands or feet or shortness of breath while lying flat in bed. Please call your doctor as soon as you notice any of these symptoms; do not wait until your next office visit. Recognize signs and symptoms of STROKE:    F-face looks uneven    A-arms unable to move or move unevenly    S-speech slurred or non-existent    T-time-call 911 as soon as signs and symptoms begin-DO NOT go       Back to bed or wait to see if you get better-TIME IS BRAIN. Warning Signs of HEART ATTACK     Call 911 if you have these symptoms:   Chest discomfort. Most heart attacks involve discomfort in the center of the chest that lasts more than a few minutes, or that goes away and comes back. It can feel like uncomfortable pressure, squeezing, fullness, or pain.  Discomfort in other areas of the upper body. Symptoms can include pain or discomfort in one or both arms, the back, neck, jaw, or stomach.  Shortness of breath with or without chest discomfort.  Other signs may include breaking out in a cold sweat, nausea, or lightheadedness. Don't wait more than five minutes to call 911 - MINUTES MATTER! Fast action can save your life. Calling 911 is almost always the fastest way to get lifesaving treatment. Emergency Medical Services staff can begin treatment when they arrive -- up to an hour sooner than if someone gets to the hospital by car. If I feel that I can not keep these promises and I am at risk of hurting myself or others, I will call the crisis office and speak with a crisis worker who will assist me during my crisis.     [facility]                [address]           [address] [facility]            [phone]    The discharge information has been reviewed with the patient. The patient verbalized understanding. Discharge medications reviewed with the patient and appropriate educational materials and side effects teaching were provided.

## 2017-05-12 NOTE — BH NOTES
Patient was in a pleasant mood, his affect seemed a bit odd, yet this may be his baseline affect. The patient exhibits no gross psychosis or justina, he is able to uphold a linear thought process and appropriate conversation. Due to patient's quick recovery, it seems his psychotic symptoms were THC-induced. The patient was discharged and his father was in agreement with this. The patient will be transported via yellow cab on a ticket to his [address] in [facility], where he will reside. He was referred to [address] [place] [address] for follow up via walk-in and continuing care paperwork was faxed.      Vivien Rico LCSW    [facility] [address] [place] [id number] [phone]  Fax: [phone]

## 2017-05-12 NOTE — DISCHARGE SUMMARY
PSYCHIATRIC DISCHARGE SUMMARY         IDENTIFICATION:    Patient Name  Bhumika Palacio   Date of Birth 1990   [facility] [id number]   Medical Record Number  [id number]      Age  28 y.o. PCP None   Admit date:  5/9/2017    Discharge date: 5/12/2017   Room Number  324/01  @ [address]   Date of Service  5/12/2017            TYPE OF DISCHARGE: REGULAR               CONDITION AT DISCHARGE: stable       PROVISIONAL & DISCHARGE DIAGNOSES:    Problem List  Never Reviewed          Codes Class    * (Principal)Bipolar affective disorder, manic, severe, with psychotic behavior ([address] ICD-10-CM: F31.2  ICD-9-CM: 296.44         Polysubstance dependence ([address] ICD-10-CM: F19.20  ICD-9-CM: 304.80               Active Hospital Problems    *Bipolar affective disorder, manic, severe, with psychotic behavior ([address]      Polysubstance dependence ([address]        DISCHARGE DIAGNOSIS:   Axis I:  SEE ABOVE  Axis II: SEE ABOVE  Axis III: SEE ABOVE  Axis IV:  Jobless, conflicts with parents, lack of structure  Axis V:  30 on admission, 65 on discharge      CC & HISTORY OF PRESENT ILLNESS:  The patient, Bhumika Palacio, is a 28 y.o. BLACK OR  male with a past psychiatric history significant for polysub dep, who presents at this time with complaints of (and/or evidence of) the following emotional symptoms: justina and psychosis. Additional symptomatology include grandiose, paranoid, Temple preoc, aud and visual timoteo, and agitation. The above symptoms have been present for a couple days. These symptoms are of severe severity. These symptoms are intermittent/ fleeting in nature. The patient's condition has been precipitated by psychosocial stressors (no job x 1 month ). Patient's condition made worse by continued illicit drug use and alcohol use as well as treatment noncompliance. UDS: +MJ  BAL=188.       SOCIAL HISTORY:    Social History     Social History    Marital status: SINGLE     Spouse name: N/A    Number of children: N/A    Years of education: N/A     Occupational History    Not on file. Social History Main Topics    Smoking status: Current Every Day Smoker     Types: Cigarettes    Smokeless tobacco: Not on file    Alcohol use Yes    Drug use: Yes     Special: Marijuana    Sexual activity: Not on file     Other Topics Concern    Not on file     Social History Narrative    Single, no children, lives with parents, born in [place] and [facility], came to [address] in 2007. College grad. No job x 1 mo,       FAMILY HISTORY:   Family History   Problem Relation Age of Onset    Alcohol abuse Father              HOSPITALIZATION COURSE:    Serjio García was admitted to the inpatient psychiatric unit [facility] for acute psychiatric stabilization in regards to symptomatology as described in the HPI above. The differential diagnosis at time of admission included: substance induced mood/psychotic dis vs first break bipolar dis vs. schizoaffective vs. Schizophrenia. While on the unit Serjio García was involved in individual, group, occupational and milieu therapy. Psychiatric medications were not instituted in order to observe pts progress/recovery while allowing the MJ to \"wash out of his system\". Serjio García demonstrated a progressive improvement in overall condition. Please see individual progress notes for more specific details regarding patient's hospitalization course. At time of discharge, Srejio García is without significant problems of depression, no overt evidence of psychosis or justina. Patient free of suicidal and homicidal ideations (appears to be at very low risk of suicide or homicide) and reports many positive predictive factors in terms of not attempting suicide or homicide. Overall presentation at time of discharge is most consistent with the diagnosis of MJ induce mood dis with psychosis. I can not fully rule out bipolar disorder or schizophreniform dis.  Patient with request for discharge today. Patient has maximized benefit to be derived from acute inpatient psychiatric treatment. All members of the treatment team concur with each other in regards to plans for discharge today per patient's request.  Patient and family are aware and in agreement with discharge and discharge plan. Per my last note:     Bipolar affective disorder, manic, severe, with psychotic behavior vs. substance (MJ) induced justina/psychosis, dif to distinguish the 2 dx at this time. irritable, denial, confabulation, sl abnl thought processes. Sleeping well.   collateral info from father obtained; \" He said the patient has used marijuana for at least 3 years or more and knows of no other illicit drug use. He's had no previous mental health history or psychiatric hospitalizations. When the patient lived with his father in [place] 1-2 years ago he would sometimes stay out all night or away for 1-2 days. The family finds it odd that the patient has a college degree and has turned down job interviews to use the degree. The patient isolates at times as well. The father was notified in recent weeks that the patient was sleeping in a park and due to his instability and job loss (as noted in his history) the father brought him to their home in [place]. \"       Plan: I will cont to observe before starting scheduled psych meds, cont with prn psych meds need more collateral info to elucidate nature of true pathology, psych prns. Will see how pt does on the general unit, has not been going to groups, will encourage group attendence as this will allow us to see more clearly his current pathology. Consider starting depakote and risperdal based on collateral info.       Polysubstance dependence ([facility] (5/9/2017)    Assessment: mostly MJ    Plan: detox from alcohol         LABS AND IMAGAING:    Labs Reviewed   GLUCOSE, FASTING   LIPID PANEL   TSH 3RD GENERATION     No results found for: DS35, PHEN, PHENO, PHENT, DILF, DS39, PHENY, PTN, VALF2, VALAC, VALP, VALPR, DS6, CRBAM, CRBAMP, CARB2, XCRBAM  No results found for any previous visit. No results found. DISPOSITION:    Home. Patient to f/u with drug/etoh rehabilitation, psychiatric, and psychotherapy appointments. Patient is to f/u with internist as directed. FOLLOW-UP CARE:    Activity as tolerated  Resume previous diet  Wound Care: none needed. Follow-up Information     Follow up With Details Comments [address] [place] [address] Nas \[facility]\"" [address] [facility]  [phone]    None   None ([phone]) Patient stated that they have no PCP                   PROGNOSIS:   Good / Viveca Ganja ---- based on nature of patient's pathology/ies and treatment compliance issues. Prognosis is greatly dependent upon patient's ability to remain sober and to follow up with drug/etoh rehabilitation and psychiatric/psychotherapy appointments            DISCHARGE MEDICATIONS: (no changes made). There are no discharge medications for this patient. A coordinated, multidisplinary treatment team round was conducted with Cici Scales is done daily here at [facility]. This team consists of the nurse, psychiatric unit pharmcist,  and writer. I have spentgreater than 35 minutes on discharge work.     Signed:  Deborah Doshi MD  5/12/2017

## 2017-05-12 NOTE — BH NOTES
GROUP THERAPY PROGRESS NOTE    Lesa Varma is participating in [employer]    Group time: 15 minutes    Personal goal for participation: Daily routine of the unit; discharge planning    Goal orientation: personal    Group therapy participation: passive    Therapeutic interventions reviewed and discussed: yes    Impression of participation: attentive

## 2017-05-12 NOTE — BH NOTES
GROUP THERAPY PROGRESS NOTE    Jassi Garay is participating in [employer]. Group time: 35 minutes    Personal goal for participation: Identify means to increase gratitude in ones daily life    Goal orientation: personal    Group therapy participation: active    Therapeutic interventions reviewed and discussed:   Topic: Gratitude Exercises    Pt reported with a big smile that he was feeling \"alright\" and happy. He stated that He is grateful for his parents and their care of him all his life. He reported that he says Agueda you\" when someone open a door, brings snacks, or offers a gift. Pt stated that he appreciates himself for his beautiful face, compassionate nature, sense of humor, and ability to drive a car. Impression of participation:   Pt presented with a full affect and euthymic mood. He was an active participant and contributed to discussion. Pts behavior was appropriate with some exaggerated expressions with his mouth. His thoughts presented as organized.       RAYMOND Dunn, Supervisee in Social Work

## 2017-05-12 NOTE — PROGRESS NOTES
Problem: Psychosis  Goal: *STG: Remains safe in hospital  Outcome: Progressing Towards Goal  Patient alert and oriented to baseline. Poor insight, continues to blame others. Speech tangential and animated. Very isolative, paranoid and suspicious. Positive for auditory and visual hallucinations. Non participatory in groups/activities. Med compliant and meal compliant with poor appetite. Impaired thought processes but cooperative with staff. Very minimal interaction with peers and staff unless approached. Continues on Q15 min safety checks. Will continue to monitor for safety. Continue plan of care.

## 2017-05-12 NOTE — BH NOTES
Pt is alert and oriented x 4, meds/meals compliant, denied SI/HI. Pt is discharged home today awaiting transportation.

## 2017-05-12 NOTE — PROGRESS NOTES
Problem: Psychosis  Goal: *STG: Remains safe in hospital  Outcome: Progressing Towards Goal  Patient has been resting in their room with their eyes closed and has showed no signs of distress through out the shift. Patient slept for 7 hours. Patient is on every 15 minute checks for safety.